# Patient Record
Sex: MALE | Race: WHITE | URBAN - METROPOLITAN AREA
[De-identification: names, ages, dates, MRNs, and addresses within clinical notes are randomized per-mention and may not be internally consistent; named-entity substitution may affect disease eponyms.]

---

## 2023-10-09 ENCOUNTER — OFFICE VISIT (OUTPATIENT)
Dept: URGENT CARE | Age: 58
End: 2023-10-09

## 2023-10-09 VITALS
SYSTOLIC BLOOD PRESSURE: 140 MMHG | OXYGEN SATURATION: 96 % | HEART RATE: 83 BPM | DIASTOLIC BLOOD PRESSURE: 72 MMHG | TEMPERATURE: 98.4 F | WEIGHT: 180 LBS

## 2023-10-09 DIAGNOSIS — S63.502A SPRAIN OF LEFT WRIST, INITIAL ENCOUNTER: Primary | ICD-10-CM

## 2023-10-09 DIAGNOSIS — M25.532 LEFT WRIST PAIN: ICD-10-CM

## 2023-10-09 RX ORDER — ALBUTEROL SULFATE 90 UG/1
2 AEROSOL, METERED RESPIRATORY (INHALATION) EVERY 6 HOURS PRN
COMMUNITY

## 2023-10-09 RX ORDER — FLUTICASONE FUROATE AND VILANTEROL 100; 25 UG/1; UG/1
1 POWDER RESPIRATORY (INHALATION) DAILY
COMMUNITY

## 2023-10-09 RX ORDER — DICLOFENAC SODIUM 75 MG/1
75 TABLET, DELAYED RELEASE ORAL 2 TIMES DAILY
Qty: 30 TABLET | Refills: 0 | Status: SHIPPED | OUTPATIENT
Start: 2023-10-09 | End: 2023-10-24

## 2023-10-09 RX ORDER — DOXEPIN HYDROCHLORIDE 10 MG/1
30 CAPSULE ORAL NIGHTLY
COMMUNITY

## 2023-10-09 RX ORDER — FLUTICASONE PROPIONATE 50 MCG
2 SPRAY, SUSPENSION (ML) NASAL 2 TIMES DAILY
COMMUNITY
Start: 2010-10-13

## 2023-10-09 NOTE — PATIENT INSTRUCTIONS
Radiology impression:  No acute osseous abnormality  Degenerative change at the D IP joints, partially imaged. Continue with wrist brace to help with compression and support  Diclofenac prescribed for pain relief  RICE therapy as discussed  Apply a compressive ACE bandage. Rest and elevate the affected painful area. Apply cold compresses intermittently as needed. As pain recedes, begin normal activities slowly as tolerated. Follow up with orthopedics if symptoms persist or if symptoms worsen.     New Prescriptions    DICLOFENAC (VOLTAREN) 75 MG EC TABLET    Take 1 tablet by mouth 2 times daily for 15 days

## 2023-10-09 NOTE — PROGRESS NOTES
Constanza Narvaez (: 1965) is a 63 y.o. male,New patient, here for evaluation of the following chief complaint(s):  Wrist Injury (Started lifting weights again briefly and now experiencing pain in left wrist. Just wants xray to confirm no fracture)      ASSESSMENT/PLAN:    ICD-10-CM    1. Sprain of left wrist, initial encounter  S63.502A diclofenac (VOLTAREN) 75 MG EC tablet      2. Left wrist pain  M25.532 XR WRIST LEFT (MIN 3 VIEWS)     diclofenac (VOLTAREN) 75 MG EC tablet          Given decreased ROM and tenderness, x-ray obtained to rule out concerns of fractures or dislocations  Initial x-ray impression: no fractures or dislocations identified  Radiology impression:  No acute osseous abnormality  Degenerative change at the D IP joints, partially imaged. No fractures noted on xray - due to history of mechanism of injury, and exam findings, concern for wrist sprain vs overuse syndrome. Low concern for compartment syndrome, hematomas, blood clots, lymphedema, cellulitis, abrasions, or lacerations. Continue with wrist brace to help with compression and support  Diclofenac prescribed for pain relief  RICE therapy as discussed  Apply a compressive ACE bandage. Rest and elevate the affected painful area. Apply cold compresses intermittently as needed. As pain recedes, begin normal activities slowly as tolerated. Follow up with orthopedics if symptoms persist or if symptoms worsen. SUBJECTIVE/OBJECTIVE:  HPI:   63 y.o. male presents for complaint of pain in left wrist x 5 days. Has been using a wrist brace to keep it immobilized, but notes the pain has been worsening. Notes he started lifting weights again over a month ago, and states he thinks the pain may be related to the increased activity. Has been lifting about 35 lbs each session. Pain noted at the base of the thumb and into the wrist. States pain is preventing full ROM of the thumb, with the pain being the limiting factor.  Also